# Patient Record
Sex: MALE | Race: WHITE
[De-identification: names, ages, dates, MRNs, and addresses within clinical notes are randomized per-mention and may not be internally consistent; named-entity substitution may affect disease eponyms.]

---

## 2021-06-10 ENCOUNTER — HOSPITAL ENCOUNTER (OUTPATIENT)
Dept: HOSPITAL 63 - SURG | Age: 66
Discharge: HOME | End: 2021-06-10
Attending: OPHTHALMOLOGY
Payer: COMMERCIAL

## 2021-06-10 VITALS — DIASTOLIC BLOOD PRESSURE: 77 MMHG | SYSTOLIC BLOOD PRESSURE: 126 MMHG

## 2021-06-10 DIAGNOSIS — H25.12: ICD-10-CM

## 2021-06-10 DIAGNOSIS — Z72.89: ICD-10-CM

## 2021-06-10 DIAGNOSIS — Z88.0: ICD-10-CM

## 2021-06-10 DIAGNOSIS — Z88.8: ICD-10-CM

## 2021-06-10 DIAGNOSIS — Z98.890: ICD-10-CM

## 2021-06-10 DIAGNOSIS — E11.36: Primary | ICD-10-CM

## 2021-06-10 DIAGNOSIS — E78.00: ICD-10-CM

## 2021-06-10 DIAGNOSIS — Z79.899: ICD-10-CM

## 2021-06-10 PROCEDURE — 82947 ASSAY GLUCOSE BLOOD QUANT: CPT

## 2021-06-10 PROCEDURE — 66984 XCAPSL CTRC RMVL W/O ECP: CPT

## 2021-06-10 PROCEDURE — V2632 POST CHMBR INTRAOCULAR LENS: HCPCS

## 2021-06-10 RX ADMIN — TROPICAMIDE SCH DROP: 10 SOLUTION/ DROPS OPHTHALMIC at 08:32

## 2021-06-10 RX ADMIN — PHENYLEPHRINE HYDROCHLORIDE SCH DROP: 25 SOLUTION/ DROPS OPHTHALMIC at 08:32

## 2021-06-10 RX ADMIN — TROPICAMIDE SCH DROP: 10 SOLUTION/ DROPS OPHTHALMIC at 08:37

## 2021-06-10 RX ADMIN — KETOROLAC TROMETHAMINE SCH DROP: 5 SOLUTION OPHTHALMIC at 08:37

## 2021-06-10 RX ADMIN — PHENYLEPHRINE HYDROCHLORIDE SCH DROP: 25 SOLUTION/ DROPS OPHTHALMIC at 08:42

## 2021-06-10 RX ADMIN — TOBRAMYCIN SCH DROP: 3 SOLUTION OPHTHALMIC at 08:32

## 2021-06-10 RX ADMIN — TROPICAMIDE SCH DROP: 10 SOLUTION/ DROPS OPHTHALMIC at 08:42

## 2021-06-10 RX ADMIN — KETOROLAC TROMETHAMINE SCH DROP: 5 SOLUTION OPHTHALMIC at 08:32

## 2021-06-10 RX ADMIN — PHENYLEPHRINE HYDROCHLORIDE SCH DROP: 25 SOLUTION/ DROPS OPHTHALMIC at 08:37

## 2021-06-10 RX ADMIN — TOBRAMYCIN SCH DROP: 3 SOLUTION OPHTHALMIC at 08:37

## 2021-06-10 NOTE — PDOC4
SURGEON:


Tonie Monk MD


Date of Procedure:   6/10/21





PREOP Diagnosis


Visually significant cataract:  Left Eye OS





POSTOP Diagnosis


Same





PROCEDURE:


Phaco w/ posterior chamber IOL:  Left Eye OS





ANESTHESIA


                  








 Deep forniceal periocular 2% Lidocaine jelly














 Naila/retro bulbar block with 2% Lidocaine with 0.5% Marcaine











DESCRIPTION OF PROCEDURE


The risks, benefits, and alternatives were discussed with the patient who 

elected to proceed.  Informed consent was obtained in writing and placed in the 

chart  After anesthetizing the eye topically, the patient was taken to the 

operating room, and the operative eye was prepped and draped in the usual 

sterile fashion for ocular surgery.  A wire lid speculum was placed.





A 1-mm clear corneal paracentesis incision was created with the side-port blade 

at a position three o'clock hours clockwise from the temporal cornea.  Then, 1% 

non-preserved Lidocaine with epinephrine was injected into the anterior chamber 

followed by viscoelastic.  Cotton-tipped applicators were used to stabilize the 

globe, and a 2.4 mm keratome was used to create a self-sealing incision in clear

cornea at the temporal limbus.  The Utrata forceps were used to create a 

continuous curvilinear capsulorrhexis.  Balanced saline solution was injected 

via cannula beneath the capsulorrhexis edge to hydrodissect the lens nucleus and

cortex from the lens capsule.  The phacoemulsification handpiece and a chopping 

instrument were then used to remove the lens nucleus.  The remaining epinuclear 

material and cortex were removed with the irrigation/aspiration handpiece.  Vis

coelastic was used to re-inflate the lens capsule, and the intraocular lens was 

injected directly into the capsular bag.  The corneal wound edges were hydrated 

with balanced salt solution on a cannula and the irrigation/aspiration


handpiece was used to extract the remaining viscoelastic.   Cefuroxime 0.1mg/ml 

/ Vigamox 0.5% was injected into the anterior chamber intracamerally.  The 

wounds were inspected and found to be watertight at an appropriate intraocular 

pressure.  Topical antibiotic drops were placed on the corneal surface.


LRI:  No


                               


If Yes,  Number []       Axis []         Length [] degrees         Depth [] 

microns


Incision Axis:  180


               


Toric Lens Axis []


Patch/shield with Maxitrol/Tobradex/Erythromycin ointment: 








 Yes














 No 








                            


                                 


Co-managed patients/postop examination stable for co-management with referring 

doctor.





EBL


EBL:  None





SPECIMANS COLLECTED


Specimens Collected:  None











TONIE MONK MD             Nasim 10, 2021 10:19

## 2021-06-24 ENCOUNTER — HOSPITAL ENCOUNTER (OUTPATIENT)
Dept: HOSPITAL 63 - SURG | Age: 66
Discharge: HOME | End: 2021-06-24
Attending: OPHTHALMOLOGY
Payer: COMMERCIAL

## 2021-06-24 VITALS — DIASTOLIC BLOOD PRESSURE: 69 MMHG | SYSTOLIC BLOOD PRESSURE: 141 MMHG

## 2021-06-24 DIAGNOSIS — Z88.8: ICD-10-CM

## 2021-06-24 DIAGNOSIS — Z79.4: ICD-10-CM

## 2021-06-24 DIAGNOSIS — H25.11: ICD-10-CM

## 2021-06-24 DIAGNOSIS — Z79.82: ICD-10-CM

## 2021-06-24 DIAGNOSIS — Z79.899: ICD-10-CM

## 2021-06-24 DIAGNOSIS — Z88.0: ICD-10-CM

## 2021-06-24 DIAGNOSIS — Z98.890: ICD-10-CM

## 2021-06-24 DIAGNOSIS — Z87.891: ICD-10-CM

## 2021-06-24 DIAGNOSIS — Z72.89: ICD-10-CM

## 2021-06-24 DIAGNOSIS — I10: ICD-10-CM

## 2021-06-24 DIAGNOSIS — G47.33: ICD-10-CM

## 2021-06-24 DIAGNOSIS — E11.36: Primary | ICD-10-CM

## 2021-06-24 PROCEDURE — 66984 XCAPSL CTRC RMVL W/O ECP: CPT

## 2021-06-24 PROCEDURE — V2632 POST CHMBR INTRAOCULAR LENS: HCPCS

## 2021-06-24 RX ADMIN — PHENYLEPHRINE HYDROCHLORIDE SCH DROP: 25 SOLUTION/ DROPS OPHTHALMIC at 08:46

## 2021-06-24 RX ADMIN — TROPICAMIDE SCH DROP: 10 SOLUTION/ DROPS OPHTHALMIC at 08:51

## 2021-06-24 RX ADMIN — KETOROLAC TROMETHAMINE SCH DROP: 5 SOLUTION OPHTHALMIC at 08:46

## 2021-06-24 RX ADMIN — TROPICAMIDE SCH DROP: 10 SOLUTION/ DROPS OPHTHALMIC at 08:40

## 2021-06-24 RX ADMIN — PHENYLEPHRINE HYDROCHLORIDE SCH DROP: 25 SOLUTION/ DROPS OPHTHALMIC at 08:51

## 2021-06-24 RX ADMIN — TROPICAMIDE SCH DROP: 10 SOLUTION/ DROPS OPHTHALMIC at 08:46

## 2021-06-24 RX ADMIN — TOBRAMYCIN SCH DROP: 3 SOLUTION OPHTHALMIC at 08:40

## 2021-06-24 RX ADMIN — PHENYLEPHRINE HYDROCHLORIDE SCH DROP: 25 SOLUTION/ DROPS OPHTHALMIC at 08:40

## 2021-06-24 RX ADMIN — TOBRAMYCIN SCH DROP: 3 SOLUTION OPHTHALMIC at 08:46

## 2021-06-24 RX ADMIN — KETOROLAC TROMETHAMINE SCH DROP: 5 SOLUTION OPHTHALMIC at 08:40

## 2021-06-24 NOTE — PDOC4
SURGEON:


Tonie Monk MD


Date of Procedure:   6/24/21





PREOP Diagnosis


Visually significant cataract:  Right Eye OD





POSTOP Diagnosis


Same





PROCEDURE:


Phaco w/ posterior chamber IOL:  Right Eye OD





ANESTHESIA


                  








 Deep forniceal periocular 2% Lidocaine jelly














 Naila/retro bulbar block with 2% Lidocaine with 0.5% Marcaine











DESCRIPTION OF PROCEDURE


The risks, benefits, and alternatives were discussed with the patient who 

elected to proceed.  Informed consent was obtained in writing and placed in the 

chart  After anesthetizing the eye topically, the patient was taken to the 

operating room, and the operative eye was prepped and draped in the usual 

sterile fashion for ocular surgery.  A wire lid speculum was placed.





A 1-mm clear corneal paracentesis incision was created with the side-port blade 

at a position three o'clock hours clockwise from the temporal cornea.  Then, 1% 

non-preserved Lidocaine with epinephrine was injected into the anterior chamber 

followed by viscoelastic.  Cotton-tipped applicators were used to stabilize the 

globe, and a 2.4 mm keratome was used to create a self-sealing incision in clear

cornea at the temporal limbus.  The Utrata forceps were used to create a 

continuous curvilinear capsulorrhexis.  Balanced saline solution was injected 

via cannula beneath the capsulorrhexis edge to hydrodissect the lens nucleus and

cortex from the lens capsule.  The phacoemulsification handpiece and a chopping 

instrument were then used to remove the lens nucleus.  The remaining epinuclear 

material and cortex were removed with the irrigation/aspiration handpiece.  V

iscoelastic was used to re-inflate the lens capsule, and the intraocular lens 

was injected directly into the capsular bag.  The corneal wound edges were 

hydrated with balanced salt solution on a cannula and the irrigation/aspiration


handpiece was used to extract the remaining viscoelastic.   Cefuroxime 0.1mg/ml 

/ Vigamox 0.5% was injected into the anterior chamber intracamerally.  The 

wounds were inspected and found to be watertight at an appropriate intraocular 

pressure.  Topical antibiotic drops were placed on the corneal surface.


LRI:  No


                               


If Yes,  Number []       Axis []         Length [] degrees         Depth [] 

microns


Incision Axis:  180


               


Toric Lens Axis []


Patch/shield with Maxitrol/Tobradex/Erythromycin ointment: 








 Yes














 No 








                            


                                 


Co-managed patients/postop examination stable for co-management with referring 

doctor.





EBL


EBL:  None





SPECIMANS COLLECTED


Specimens Collected:  None











TONIE MONK MD             Jun 24, 2021 10:19